# Patient Record
Sex: MALE | Race: BLACK OR AFRICAN AMERICAN | NOT HISPANIC OR LATINO | URBAN - METROPOLITAN AREA
[De-identification: names, ages, dates, MRNs, and addresses within clinical notes are randomized per-mention and may not be internally consistent; named-entity substitution may affect disease eponyms.]

---

## 2024-04-02 ENCOUNTER — APPOINTMENT (OUTPATIENT)
Dept: DERMATOLOGY | Facility: CLINIC | Age: 69
End: 2024-04-02
Payer: COMMERCIAL

## 2024-04-08 ENCOUNTER — APPOINTMENT (OUTPATIENT)
Dept: DERMATOLOGY | Facility: CLINIC | Age: 69
End: 2024-04-08
Payer: COMMERCIAL

## 2024-06-11 ENCOUNTER — OFFICE VISIT (OUTPATIENT)
Dept: DERMATOLOGY | Facility: CLINIC | Age: 69
End: 2024-06-11
Payer: COMMERCIAL

## 2024-06-11 DIAGNOSIS — L30.9 DERMATITIS: Primary | ICD-10-CM

## 2024-06-11 PROCEDURE — 1160F RVW MEDS BY RX/DR IN RCRD: CPT | Performed by: DERMATOLOGY

## 2024-06-11 PROCEDURE — 99204 OFFICE O/P NEW MOD 45 MIN: CPT | Performed by: DERMATOLOGY

## 2024-06-11 PROCEDURE — 11104 PUNCH BX SKIN SINGLE LESION: CPT | Performed by: DERMATOLOGY

## 2024-06-11 PROCEDURE — 1159F MED LIST DOCD IN RCRD: CPT | Performed by: DERMATOLOGY

## 2024-06-11 RX ORDER — CLOBETASOL PROPIONATE 0.46 MG/ML
SOLUTION TOPICAL
Qty: 50 ML | Refills: 1 | Status: SHIPPED | OUTPATIENT
Start: 2024-06-11

## 2024-06-11 RX ORDER — HYDROXYZINE PAMOATE 25 MG/1
25 CAPSULE ORAL
COMMUNITY
Start: 2024-03-29

## 2024-06-11 RX ORDER — CICLOPIROX 80 MG/ML
SOLUTION TOPICAL
COMMUNITY
Start: 2024-03-04

## 2024-06-11 RX ORDER — CLOTRIMAZOLE AND BETAMETHASONE DIPROPIONATE 10; .64 MG/G; MG/G
CREAM TOPICAL 2 TIMES DAILY
COMMUNITY
Start: 2024-03-04

## 2024-06-11 RX ORDER — PREGABALIN 200 MG/1
CAPSULE ORAL EVERY 8 HOURS
COMMUNITY
Start: 2022-09-27

## 2024-06-11 RX ORDER — OMEPRAZOLE 20 MG/1
1 CAPSULE, DELAYED RELEASE ORAL DAILY
COMMUNITY
Start: 2022-07-11

## 2024-06-11 RX ORDER — MIRTAZAPINE 7.5 MG/1
1 TABLET, FILM COATED ORAL NIGHTLY
COMMUNITY
Start: 2022-08-15

## 2024-06-11 RX ORDER — NALOXONE HYDROCHLORIDE 4 MG/.1ML
1 SPRAY NASAL
COMMUNITY
Start: 2022-09-23

## 2024-06-11 RX ORDER — TRIAMCINOLONE ACETONIDE 0.25 MG/G
CREAM TOPICAL 2 TIMES DAILY
COMMUNITY
Start: 2023-01-16

## 2024-06-11 RX ORDER — ATORVASTATIN CALCIUM 40 MG/1
1 TABLET, FILM COATED ORAL DAILY
COMMUNITY
Start: 2023-08-02

## 2024-06-11 RX ORDER — OXYCODONE AND ACETAMINOPHEN 7.5; 325 MG/1; MG/1
TABLET ORAL
COMMUNITY

## 2024-06-11 RX ORDER — ALBUTEROL SULFATE 90 UG/1
AEROSOL, METERED RESPIRATORY (INHALATION)
COMMUNITY
Start: 2022-08-31

## 2024-06-11 RX ORDER — TADALAFIL 10 MG/1
TABLET ORAL
COMMUNITY
Start: 2023-09-20

## 2024-06-11 RX ORDER — NAPROXEN 500 MG/1
TABLET ORAL
COMMUNITY
Start: 2022-10-27

## 2024-06-11 RX ORDER — AMLODIPINE BESYLATE 5 MG/1
5 TABLET ORAL DAILY
COMMUNITY

## 2024-06-11 RX ORDER — NAPROXEN SODIUM 220 MG/1
1 TABLET, FILM COATED ORAL DAILY
COMMUNITY
Start: 2022-09-15

## 2024-06-11 RX ORDER — TIOTROPIUM BROMIDE 18 UG/1
CAPSULE ORAL; RESPIRATORY (INHALATION)
COMMUNITY
Start: 2024-01-11

## 2024-06-11 NOTE — PROGRESS NOTES
Subjective     Jeancarlos Duarte is a 68 y.o. male who presents for the following: Suspicious Skin Lesion (Pt presents to office for evaluation of lesions on scalp per PCP. Lots of flaking and redness itching.).     Review of Systems:  No other skin or systemic complaints other than what is documented elsewhere in the note.    The following portions of the chart were reviewed this encounter and updated as appropriate:   Allergies  Meds  Problems  Med Hx  Surg Hx  Fam Hx         Skin Cancer History  No skin cancer on file.      Specialty Problems    None       Objective   Well appearing patient in no apparent distress; mood and affect are within normal limits.    A focused skin examination was performed. All findings within normal limits unless otherwise noted below.    Assessment/Plan   1. Dermatitis  Mid Parietal Scalp  Hyper and hypopigmented plaques with scarring    -Discussed differential diagnosis and treatment options  -Concern for discoid lupus  -Recommend to start treatment with tCS clobetasol solution  -When the rash is active, apply topical corticosteroids to the active areas of the rash as prescribed  -Discussed with/information given to the patient on the risks, benefits and alternatives of the usage of topical corticosteroids, including but not limited to: atrophy (thinning of the skin), striae (stretch marks), telangiectasia (blood vessel growth), and dyspigmentation (discoloration of the skin).  -Recommend to limit long-term use of topical corticosteroids to less than 14 days per month to reduce risk of side effects.  -Recommend:    Skin biopsy - Mid Parietal Scalp  Type of biopsy: punch    Informed consent: discussed and consent obtained    Timeout: patient name, date of birth, surgical site, and procedure verified    Procedure prep:  Patient was prepped and draped  Anesthesia: the lesion was anesthetized in a standard fashion    Anesthetic:  1% lidocaine w/ epinephrine 1-100,000 local  infiltration  Punch size:  4 mm  Suture size:  3-0  Suture type: nylon    Suture removal (days):  14  Hemostasis achieved with: suture    Outcome: patient tolerated procedure well    Post-procedure details: sterile dressing applied and wound care instructions given    Dressing type: bandage and petrolatum      Staff Communication: Dermatology Local Anesthesia: Site Location: R parietal scalp 1 % Lidocaine / Epinephrine - Amount: 1cc - Mid Parietal Scalp    Related Medications  clobetasol (Temovate) 0.05 % external solution  Apply to affected areas twice daily when active as needed.        Follow up in 2 weeks with Dr Olivarez and for suture removal  Discussed if there are any changes or development of concerning symptoms (lesion/skin condition is changing, bleeding, enlarging, or worsening) the patient is to contact my office. The patient verbalizes understanding.    Kim Irizarry MD  6/11/2024

## 2024-06-12 ENCOUNTER — TELEPHONE (OUTPATIENT)
Dept: DERMATOLOGY | Facility: CLINIC | Age: 69
End: 2024-06-12
Payer: COMMERCIAL

## 2024-06-12 NOTE — TELEPHONE ENCOUNTER
Pt contacted office asking what kind of mild soap he should use for his scalp and if he should take the bandage off his biopsy site when he showers. Advised pt he can use Dove soap and to remove the bandage when showering and reapply Vaseline and a clean bandage daily for a few days. Pt also wanted to know whether he should apply the clobetasol solution after his shower. Advised pt to apply clobetasol solution after showering and to apply it twice daily. Pt verbalized understanding and had no further questions at this time.     Shereen Griffith RN

## 2024-06-13 LAB
LABORATORY COMMENT REPORT: NORMAL
PATH REPORT.FINAL DX SPEC: NORMAL
PATH REPORT.GROSS SPEC: NORMAL
PATH REPORT.RELEVANT HX SPEC: NORMAL
PATH REPORT.TOTAL CANCER: NORMAL

## 2024-06-14 NOTE — RESULT ENCOUNTER NOTE
Pt was contacted and notified of benign results at this time.  No further questions noted. Pt to follow up in 2 weeks time with Dr. Olivarez.    Rafael Garcia LPN

## 2024-06-25 ENCOUNTER — APPOINTMENT (OUTPATIENT)
Dept: DERMATOLOGY | Facility: CLINIC | Age: 69
End: 2024-06-25
Payer: COMMERCIAL

## 2024-06-25 DIAGNOSIS — L28.0 LICHEN SIMPLEX CHRONICUS: Primary | ICD-10-CM

## 2024-06-25 PROCEDURE — 1160F RVW MEDS BY RX/DR IN RCRD: CPT | Performed by: DERMATOLOGY

## 2024-06-25 PROCEDURE — 99214 OFFICE O/P EST MOD 30 MIN: CPT | Performed by: DERMATOLOGY

## 2024-06-25 PROCEDURE — 1159F MED LIST DOCD IN RCRD: CPT | Performed by: DERMATOLOGY

## 2024-06-25 RX ORDER — CLOBETASOL PROPIONATE 0.5 MG/G
OINTMENT TOPICAL
Qty: 60 G | Refills: 3 | Status: SHIPPED | OUTPATIENT
Start: 2024-06-25

## 2024-06-25 NOTE — PROGRESS NOTES
Subjective     Jeancarlos Duarte is a 68 y.o. male who presents for the following: thickened skin (Pt reports to clinic for follow up on punch biopsy.  Biopsy shows thickened skin. Pt reports that scalp is not much better. Clobetasol not helpful) and Suture / Staple Removal (Pt reports to office for suture removal to mid parietal scalp punch biopsy.).     Review of Systems:  No other skin or systemic complaints other than what is documented elsewhere in the note.    The following portions of the chart were reviewed this encounter and updated as appropriate:   Allergies  Meds  Problems  Med Hx  Surg Hx  Fam Hx         Skin Cancer History  No skin cancer on file.      Specialty Problems    None       Objective   Well appearing patient in no apparent distress; mood and affect are within normal limits.    A focused skin examination was performed. All findings within normal limits unless otherwise noted below.    Assessment/Plan   1. Lichen simplex chronicus  Scalp  Lichenified hyperpigmented plaques    -Discussed nature of diagnosis  -Punch biopsy showed HYPERKERATOSIS, ACANTHOSIS, AND HYPERGRANULOSIS, WITH DERMAL FIBROSIS, (SEE COMMENT):  Comment:  The epidermal features may be seen in an area of chronic rubbing, such as evolving lichen simplex chronicus.  The papillary dermal fibrosis is also suggestive of this.  The collagen bundles are thickened throughout the dermis, without loss of adnexa or periadnexal adipose tissue, and may be within the realm of that expected for scalp.  -Recommend discontinuation of pressure or manipulation of the area  -Patient endorses 10 year history of itching/scratching  -Topical steroids may help to thin out areas of thickened skin  -Stop clobetasol solution and start clobetasol ointment twice daily    -Discussed with/information given to the patient on the risks, benefits and alternatives of the usage of topical corticosteroids, including but not limited to: atrophy (thinning of  the skin), striae (stretch marks), telangiectasia (blood vessel growth), and dyspigmentation (discoloration of the skin).    -Sutures removed        Related Medications  clobetasol (Temovate) 0.05 % ointment  Apply to affected areas of the scalp twice daily.        Follow up in 3 months for LSC  Discussed if there are any changes or development of concerning symptoms (lesion/skin condition is changing, bleeding, enlarging, or worsening) the patient is to contact my office. The patient verbalizes understanding.    Kim Irizarry MD  6/25/2024